# Patient Record
Sex: FEMALE
[De-identification: names, ages, dates, MRNs, and addresses within clinical notes are randomized per-mention and may not be internally consistent; named-entity substitution may affect disease eponyms.]

---

## 2019-12-04 ENCOUNTER — HOSPITAL ENCOUNTER (OUTPATIENT)
Dept: HOSPITAL 92 - BICMRI | Age: 74
Discharge: HOME | End: 2019-12-04
Attending: ANESTHESIOLOGY
Payer: MEDICARE

## 2019-12-04 DIAGNOSIS — M48.02: Primary | ICD-10-CM

## 2019-12-04 DIAGNOSIS — M47.812: ICD-10-CM

## 2019-12-04 DIAGNOSIS — R60.0: ICD-10-CM

## 2019-12-04 PROCEDURE — 72141 MRI NECK SPINE W/O DYE: CPT

## 2019-12-04 PROCEDURE — 72052 X-RAY EXAM NECK SPINE 6/>VWS: CPT

## 2019-12-04 NOTE — MRI
MRI cervical spine noncontrast



HISTORY: Neck pain. Cervical stenosis. Right arm radiculopathy.



FINDINGS: Vertebral body heights are maintained.

There is desiccation of all of the intervertebral discs.



There are ill-defined areas of increased T2 signal and diminished T1 signal within the right side of 
the C1 vertebral body and the right side of the C2 vertebral body. Odontoid process is not

involved. Small amount of fluid is present within the joint space. There is also small amount of flui
d within the facets throughout the cervical spine.



More subtle but similar bone marrow edema is present within the bone marrow of the C4 and C5 left fac
ets.







C2-3: Mild osteophytosis. Central canal and right neural foramen are patent. Mild stenosis of the lef
t neural foramen.



C3-4: Mild disc space narrowing. Mild posterior osteophyte/disc complex. Circumferential degenerative
 changes. Moderate stenosis of the central canal and each neural foramen.



C4-5: Mild posterior osteophyte/disc complex. Circumferential degenerative changes with slight flatte
yobani of the ventral aspect of the thecal sac and spinal cord. No abnormal signal within the cord.

Moderate to severe central canal stenosis. Neural foramina are patent.



C5-6: Disc space narrowing. Retrolisthesis at 0.3 cm. Mild associated osteophyte/disc complex. Circum
ferential degenerative changes with severe stenosis of the central canal. Moderate right and severe

left foraminal stenoses.



C6-7, C7-T1: Mild osteophytosis. Central canal and neural foramina are patent.











IMPRESSION: Bone marrow edema on each side of the right C1-2 joint and the left C4-5 facet joint. The
re is a small amount of fluid within the right C1-2 joint. Reactive edema from arthritis is the

most likely explanation. Infection could also have this appearance, although there are no other findi
ngs to suggest infection.



Degenerative changes within the cervical spine as detailed above, with foraminal stenosis most pronou
nced on the left at the C5-6 level and central canal stenosis most pronounced at the C4-5 level.



Reported By: RICARDO Trujillo 

Electronically Signed:  12/4/2019 3:47 PM

## 2019-12-04 NOTE — RAD
CERVICAL SPINE RADIOGRAPHS WITH BENDING LATERAL PROJECTIONS (8 images total)



INDICATION: Right-sided neck pain extending into right arm



COMPARISON: MR cervical spine without contrast



FINDINGS: 



Disc and facets joints:  There is advanced disc degenerative disease at C5-C6. There is mild multilev
el disc degenerative disease at the remaining cervical levels. There is asymmetrical narrowing and

subchondral sclerosis involving the right C1-C2 articular facet.



Fracture:  No acute fracture.



Alignment:  There is very mild anterior translation of C4 on C5 and C2 on C3 on the neutral lateral p
rojections. There is also mild anterior translation of C6 on C7 on the neutral lateral projections.

With flexion the anterior translation of C2 on C3 and C3 on C4 is slightly accentuated. The anterior 
translation of C4-5 is also slightly accentuated with flexion. The anterior translation of C6 on C7

is stable. With extension there is slight reduction of the anterior translation of C2 on C3, C3-4 and
 C4-5 with no appreciable translational motion at C6-7.



Prevertebral soft tissues:  Normal



Lung apices:  Clear.



IMPRESSION: Moderate to severe spondylosis of the cervical spine with abnormal translational motion s
een at C2-3, C3-4 and C4-5. Mild anterior translation of C6-7 is stable with flexion and extension.

Nonspecific asymmetric osteoarthrosis involving the right C1-C2 facet complex.



Reported By: Magdaleno Lujan 

Electronically Signed:  12/4/2019 4:04 PM

## 2020-01-09 ENCOUNTER — HOSPITAL ENCOUNTER (OUTPATIENT)
Dept: HOSPITAL 92 - BICCT | Age: 75
Discharge: HOME | End: 2020-01-09
Attending: ANESTHESIOLOGY
Payer: MEDICARE

## 2020-01-09 DIAGNOSIS — M47.812: ICD-10-CM

## 2020-01-09 DIAGNOSIS — S12.091G: Primary | ICD-10-CM

## 2020-01-09 PROCEDURE — 72125 CT NECK SPINE W/O DYE: CPT

## 2020-01-09 NOTE — CT
CERVICAL SPINE CT 

1/9/20

 

COMPARISON: 

None.

 

HISTORY: 

Nondisplaced fracture of C1, neck pain. 

 

TECHNIQUE:  

Axial CT imaging obtained at 2 mm intervals from the skull base through lung apices without contrast.
 Coronal and sagittal reformatted imaging obtained. 

 

FINDINGS: 

The imaged lung apices appear unremarkable. Evaluation for central canal and or neural foraminal sten
osis is limited on routine CT examination. 

 

There is moderate degenerative change at the atlantoaxial interspace. The craniocervical junction adelina
ears intact. 

 

The C1 ring appears intact with no evidence for C1 fracture. Occipital condyles appear intact. No kamila
dence for dens fracture. There is severe degenerative change at the C1-2 articulation on the right wi
th associated joint space narrowing subchondral sclerosis and osteophyte formation. 

 

There is mild anterolisthesis at C2-3 measuring 2-3 mm. No prevertebral soft tissue swelling noted. 

 

C2-3: Mild left facet and uncovertebral osteophyte formation with no osseous cause of significant gonzales
tral canal or neural foraminal stenosis. 

 

C3-4: Mild bilateral facet and uncovertebral osteophyte formation with no osseous cause of significan
t central canal or neural foraminal stenosis. 

 

C4-5: Bilateral facet and uncovertebral osteophyte formation with no osseous cause of significant gonzales
tral canal or neural foraminal stenosis. 

 

C5-6: There is disc space narrowing with sclerotic degenerative end plate changes. There is prominent
 bilateral uncovertebral osteophyte formation with encroachment on bilateral neural foramina causing 
significant bilateral neural stenosis, left greater than right. 

 

C6-7: Facet hypertrophy noted on the right with mild encroachment on the right neural foramen. No oss
eous cause of significant central canal or left neural foraminal stenosis. 

 

C7-T1: There is facet hypertrophy on the right. There is no osseous cause of significant central pili
l or neural foraminal stenosis. There is bilateral facet hypertrophy at T1-2. No worrisome lytic or b
lastic bone lesion. No acute fracture or dislocation. 

 

IMPRESSION: 

Multilevel cervical spine degenerative change as described above. 

 

POS: [facility]

## 2023-08-09 ENCOUNTER — HOSPITAL ENCOUNTER (OUTPATIENT)
Dept: HOSPITAL 92 - RAD | Age: 78
Discharge: HOME | End: 2023-08-09
Attending: ANESTHESIOLOGY
Payer: MEDICARE

## 2023-08-09 DIAGNOSIS — S42.202B: Primary | ICD-10-CM

## 2023-10-12 ENCOUNTER — HOSPITAL ENCOUNTER (OUTPATIENT)
Dept: HOSPITAL 92 - CSHMRI | Age: 78
Discharge: HOME | End: 2023-10-12
Attending: NURSE PRACTITIONER
Payer: MEDICARE

## 2023-10-12 DIAGNOSIS — M47.812: Primary | ICD-10-CM

## 2023-10-12 PROCEDURE — 72141 MRI NECK SPINE W/O DYE: CPT

## 2024-06-18 ENCOUNTER — HOSPITAL ENCOUNTER (EMERGENCY)
Dept: HOSPITAL 57 - BURERS | Age: 79
Discharge: HOME | End: 2024-06-18
Payer: COMMERCIAL

## 2024-06-18 DIAGNOSIS — E03.9: ICD-10-CM

## 2024-06-18 DIAGNOSIS — E78.5: ICD-10-CM
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
## 2024-06-18 DIAGNOSIS — S00.83XA: ICD-10-CM

## 2024-06-18 DIAGNOSIS — Z79.899: ICD-10-CM

## 2024-06-18 DIAGNOSIS — S51.812A: Primary | ICD-10-CM

## 2024-06-18 DIAGNOSIS — I10: ICD-10-CM
